# Patient Record
Sex: MALE | Race: OTHER | NOT HISPANIC OR LATINO | Employment: FULL TIME | URBAN - METROPOLITAN AREA
[De-identification: names, ages, dates, MRNs, and addresses within clinical notes are randomized per-mention and may not be internally consistent; named-entity substitution may affect disease eponyms.]

---

## 2018-02-01 ENCOUNTER — HOSPITAL ENCOUNTER (EMERGENCY)
Facility: HOSPITAL | Age: 53
Discharge: HOME | End: 2018-02-01
Attending: EMERGENCY MEDICINE | Admitting: EMERGENCY MEDICINE
Payer: OTHER MISCELLANEOUS

## 2018-02-01 ENCOUNTER — APPOINTMENT (EMERGENCY)
Dept: RADIOLOGY | Facility: HOSPITAL | Age: 53
End: 2018-02-01
Payer: OTHER MISCELLANEOUS

## 2018-02-01 VITALS
WEIGHT: 240 LBS | TEMPERATURE: 98.8 F | RESPIRATION RATE: 18 BRPM | HEART RATE: 76 BPM | SYSTOLIC BLOOD PRESSURE: 159 MMHG | DIASTOLIC BLOOD PRESSURE: 83 MMHG | OXYGEN SATURATION: 97 %

## 2018-02-01 DIAGNOSIS — S90.30XA FOOT CONTUSION: Primary | ICD-10-CM

## 2018-02-01 PROCEDURE — 99283 EMERGENCY DEPT VISIT LOW MDM: CPT

## 2018-02-01 PROCEDURE — 73620 X-RAY EXAM OF FOOT: CPT

## 2018-02-01 RX ORDER — ATORVASTATIN CALCIUM 20 MG/1
20 TABLET, FILM COATED ORAL DAILY
COMMUNITY

## 2018-02-01 RX ORDER — LOSARTAN POTASSIUM 25 MG/1
25 TABLET ORAL DAILY
COMMUNITY

## 2018-02-01 RX ORDER — IBUPROFEN 400 MG/1
800 TABLET ORAL ONCE
Status: COMPLETED | OUTPATIENT
Start: 2018-02-01 | End: 2018-02-01

## 2018-02-01 RX ADMIN — IBUPROFEN 800 MG: 400 TABLET, FILM COATED ORAL at 12:20

## 2018-02-01 NOTE — DISCHARGE INSTRUCTIONS
Foot Contusion   WHAT YOU NEED TO KNOW:   A foot contusion is a bruise to the foot  DISCHARGE INSTRUCTIONS:   Medicines:   · NSAIDs:  These medicines decrease swelling and pain  NSAIDs are available without a doctor's order  Ask your healthcare provider which medicine is right for you  Ask how much to take and when to take it  Take as directed  NSAIDs can cause stomach bleeding and kidney problems if not taken correctly  · Take your medicine as directed  Contact your healthcare provider if you think your medicine is not helping or if you have side effects  Tell him of her if you are allergic to any medicine  Keep a list of the medicines, vitamins, and herbs you take  Include the amounts, and when and why you take them  Bring the list or the pill bottles to follow-up visits  Carry your medicine list with you in case of an emergency  Follow up with your healthcare provider as directed:  Write down your questions so you remember to ask them during your visits  Care for your foot: Follow your treatment plan to help decrease your pain and improve your muscle movement  · Rest:  You will need to rest your foot for 1 to 2 days after your injury  This will help decrease the risk of more damage  · Ice:  Ice helps decrease swelling and pain  Ice may also help prevent tissue damage  Use an ice pack, or put crushed ice in a plastic bag  Cover it with a towel and place it on your foot for 15 to 20 minutes every hour or as directed  · Compression:  Compression (tight hold) provides support and helps decrease swelling and movement so your foot can heal  You may be told to keep your foot wrapped with a tight elastic bandage  Follow instructions about how to apply your bandage  Do not massage your foot  You could cause more damage or pain  · Elevation:  Keep your foot raised above the level of your heart while you are sitting or lying down  This will help decrease or limit swelling   Use pillows, blankets, or rolled towels to elevate your foot comfortably  Exercise your foot:  You may be given gentle exercises to improve your foot movement and help decrease stiffness  Ask when you can return to your normal activities or sports  Prevent another injury:   · Wear equipment to protect yourself when you play sports  · Make sure your shoes fit properly  · Always wear shoes on streets or sidewalks  · Clean spills off the floor right away to avoid slipping or hitting your foot  · Make sure your home is well lit when you get up during the night  This will help you avoid hurting your foot in the dark  Contact your healthcare provider if:   · You have increased swelling on your foot  · You have severe foot pain  · You are not able to move your foot  · You have questions or concerns about your injury or treatment  © 2017 [employer] Information is for End User's use only and may not be sold, redistributed or otherwise used for commercial purposes  All illustrations and images included in CareNotes® are the copyrighted property of [employer]  or [employer]ov[employer]  The above information is an  only  It is not intended as medical advice for individual conditions or treatments  Talk to your doctor, nurse or pharmacist before following any medical regimen to see if it is safe and effective for you
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
